# Patient Record
Sex: MALE | Race: WHITE
[De-identification: names, ages, dates, MRNs, and addresses within clinical notes are randomized per-mention and may not be internally consistent; named-entity substitution may affect disease eponyms.]

---
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
## 2019-06-15 ENCOUNTER — HOSPITAL ENCOUNTER (EMERGENCY)
Dept: HOSPITAL 41 - JD.ED | Age: 19
Discharge: HOME | End: 2019-06-15
Payer: COMMERCIAL

## 2019-06-15 DIAGNOSIS — A08.4: Primary | ICD-10-CM

## 2019-06-15 PROCEDURE — 85025 COMPLETE CBC W/AUTO DIFF WBC: CPT

## 2019-06-15 PROCEDURE — 99284 EMERGENCY DEPT VISIT MOD MDM: CPT

## 2019-06-15 PROCEDURE — 86140 C-REACTIVE PROTEIN: CPT

## 2019-06-15 PROCEDURE — 80053 COMPREHEN METABOLIC PANEL: CPT

## 2019-06-15 PROCEDURE — 36415 COLL VENOUS BLD VENIPUNCTURE: CPT

## 2019-06-15 PROCEDURE — 96374 THER/PROPH/DIAG INJ IV PUSH: CPT

## 2019-06-15 NOTE — EDM.PDOC
ED HPI GENERAL MEDICAL PROBLEM





- General


Chief Complaint: Gastrointestinal Problem


Stated Complaint: VOMITING AND LIGHTHEADED


Time Seen by Provider: 06/15/19 10:40


Source of Information: Reports: Patient


History Limitations: Reports: No Limitations





- History of Present Illness


INITIAL COMMENTS - FREE TEXT/NARRATIVE: 





Patient states that he awoke from sleep around 0430 hrs. with acute onset of 

nausea and vomiting. He indicates the first 3 emesis contained food eaten for 

supper last night which was fish and chips from [employer]. After this 

emesis is been bilious but nothing will stay down. Associated feeling of fever 

with chills. Some diffuse epigastric abdominal discomfort not radiating through 

to his back. No recent heavy alcohol use. No previous abdominal surgery. No 

diarrhea. Nurses have gone ahead and started him on IV normal saline at open 

and have given him Zofran 4 mg IV for nausea and he is starting to feel 

improved at the time of my exam.


Onset: Today


Onset Date: 06/15/19


Onset Time: 04:30


Duration: Hour(s):


Location: Reports: Abdomen


Quality: Reports: Ache (Epigastric pain with recurrent nausea and vomiting 

since 4:30 this morning)


Severity: Moderate


Improves with: Reports: None


Worsens with: Reports: Other


Context: Reports: Other.  Denies: Activity (Trying to drink.), Exercise, Lifting

, Sick Contact, Trauma


Associated Symptoms: Reports: Loss of Appetite, Malaise, Nausea/Vomiting.  

Denies: Confusion, Chest Pain, Cough, cough w sputum, Diaphoresis, Fever/Chills

, Headaches, Rash, Seizure (Intractable nausea and vomiting since 0430 hrs. 

this morning), Shortness of Breath, Syncope


Treatments PTA: Reports: Other (see below) (None.)





- Related Data


 Allergies











Allergy/AdvReac Type Severity Reaction Status Date / Time


 


No Known Allergies Allergy   Verified 06/15/19 10:12











Home Meds: 


 Home Meds





Dicyclomine [Bentyl] 20 mg PO Q6H PRN #5 tablet 06/15/19 [Rx]


Ondansetron [Zofran] 4 mg BUCCAL Q6H PRN #5 tab 06/15/19 [Rx]











Past Medical History





- Past Health History


Medical/Surgical History: Denies Medical/Surgical History





Social & Family History





- Tobacco Use


Smoking Status *Q: Never Smoker





- Caffeine Use


Caffeine Use: Reports: None





- Recreational Drug Use


Recreational Drug Use: No





- Living Situation & Occupation


Living situation: Reports: Single, with Family


Occupation: Student





ED ROS GENERAL





- Review of Systems


Review Of Systems: See Below


Constitutional: Reports: No Symptoms


HEENT: Reports: No Symptoms


Respiratory: Reports: No Symptoms


Cardiovascular: Reports: No Symptoms


Endocrine: Reports: No Symptoms


GI/Abdominal: Reports: No Symptoms


: Reports: No Symptoms


Musculoskeletal: Reports: No Symptoms


Skin: Reports: No Symptoms


Neurological: Reports: No Symptoms


Psychiatric: Reports: No Symptoms


Hematologic/Lymphatic: Reports: No Symptoms


Immunologic: Reports: No Symptoms





ED EXAM, GI/ABD





- Physical Exam


Exam: See Below


Exam Limited By: No Limitations


General Appearance: Alert, WD/WN, No Apparent Distress, Other (Does not feel 

warm to palpation. Nurses report temperature 37.2. Pulse is 83 and sinus 

respiratory of 18. BP is slightly elevated 141/86. Sats are 100% on room air.)


Eyes: Bilateral: Normal Appearance (No scleral icterus.)


Throat/Mouth: Other


Head: Atraumatic, Normocephalic (Mouth is mildly dry and tongue is coated.)


Neck: Normal Inspection, Supple, Non-Tender, Full Range of Motion.  No: 

Lymphadenopathy (L), Lymphadenopathy (R)


Respiratory/Chest: No Respiratory Distress, Lungs Clear, Normal Breath Sounds, 

No Accessory Muscle Use


Cardiovascular: Normal Peripheral Pulses, Regular Rate, Rhythm, No Edema, No 

Gallop, No Murmur, No Rub


GI/Abdominal Exam: Tender (Bowel sounds are fairly quiet sent in all 4 

quadrants.), Abnormal Bowel Sounds, Other.  No: Guarding ( Mild tenderness in 

the epigastrium.), Rigid, Rebound


 (Male) Exam: No Hernia (No peritoneal signs.)


Back Exam: Normal Inspection, Full Range of Motion.  No: CVA Tenderness (L)


Extremities: Normal Inspection, Normal Range of Motion, Non-Tender, Normal 

Capillary Refill


Neurological: Alert, Oriented, CN II-XII Intact, Normal Cognition


Psychiatric: Normal Affect, Normal Mood


Skin Exam: Warm, Dry, Intact, Normal Color, No Rash





Course





- Vital Signs


Last Recorded V/S: 


 Last Vital Signs











Temp  37.2 C   06/15/19 10:08


 


Pulse  83   06/15/19 10:08


 


Resp  18   06/15/19 10:08


 


BP  141/86 H  06/15/19 10:08


 


Pulse Ox  100   06/15/19 10:08








 





Orthostatic Blood Pressure [     129/83


Standing]                        


Orthostatic Blood Pressure [     138/83


Supine]                          











- Orders/Labs/Meds


Labs: 


 Laboratory Tests











  06/15/19 06/15/19 Range/Units





  10:15 10:15 


 


WBC  12.36 H   (4.23-9.07)  K/mm3


 


RBC  5.71   (4.63-6.08)  M/mm3


 


Hgb  17.2  D   (13.7-17.5)  gm/L


 


Hct  48.8   (40.1-51.0)  %


 


MCV  85.5   (79.0-92.2)  fl


 


MCH  30.1   (25.7-32.2)  pg


 


MCHC  35.2   (32.2-35.5)  g/dl


 


RDW Std Deviation  39.4   (35.1-43.9)  fL


 


Plt Count  175   (163-337)  K/mm3


 


MPV  10.6   (9.4-12.3)  fl


 


Neut % (Auto)  89.4 H   (34.0-67.9)  %


 


Lymph % (Auto)  3.4 L   (21.8-53.1)  %


 


Mono % (Auto)  6.5   (5.3-12.2)  %


 


Eos % (Auto)  0.3 L   (0.8-7.0)  


 


Baso % (Auto)  0.1   (0.1-1.2)  %


 


Neut # (Auto)  11.05 H   (1.78-5.38)  K/mm3


 


Lymph # (Auto)  0.42 L   (1.32-3.57)  K/mm3


 


Mono # (Auto)  0.80   (0.30-0.82)  K/mm3


 


Eos # (Auto)  0.04   (0.04-0.54)  K/mm3


 


Baso # (Auto)  0.01   (0.01-0.08)  K/mm3


 


Manual Slide Review  Abnormal smear   


 


Sodium   141  (136-145)  mEq/L


 


Potassium   3.9  (3.5-5.1)  mEq/L


 


Chloride   104  ()  mEq/L


 


Carbon Dioxide   27  (21-32)  mEq/L


 


Anion Gap   13.9  (5-15)  


 


BUN   21 H  (7-18)  mg/dL


 


Creatinine   1.1  (0.7-1.3)  mg/dL


 


Est Cr Clr Drug Dosing   135.00  mL/min


 


Estimated GFR (MDRD)   > 60  (>60)  mL/min


 


BUN/Creatinine Ratio   19.1 H  (14-18)  


 


Glucose   118 H  ()  mg/dL


 


Calcium   9.6  (8.5-10.1)  mg/dL


 


Total Bilirubin   0.7  (0.2-1.0)  mg/dL


 


AST   23  (15-37)  U/L


 


ALT   28  (16-63)  U/L


 


Alkaline Phosphatase   85  ()  U/L


 


C-Reactive Protein   < 0.2  (<1.0)  mg/dL


 


Total Protein   7.6  (6.4-8.2)  g/dl


 


Albumin   4.5  (3.4-5.0)  g/dl


 


Globulin   3.1  gm/dL


 


Albumin/Globulin Ratio   1.5  (1-2)  











Meds: 


Medications














Discontinued Medications














Generic Name Dose Route Start Last Admin





  Trade Name Freq  PRN Reason Stop Dose Admin


 


Dicyclomine HCl  10 mg  06/15/19 11:29  06/15/19 11:33





  Bentyl  PO  06/15/19 11:30  10 mg





  ONETIME ONE   Administration





     





     





     





     


 


Sodium Chloride  1,000 mls @ 999 mls/hr  06/15/19 10:14  06/15/19 10:18





  Normal Saline  IV  06/15/19 11:14  999 mls/hr





  ONETIME ONE   Administration





     





     





     





     


 


Ondansetron HCl  4 mg  06/15/19 10:14  06/15/19 10:18





  Zofran  IVPUSH  06/15/19 10:15  4 mg





  ONETIME ONE   Administration





     





     





     





     














- Radiology Interpretation


Free Text/Narrative:: 


19-year-old male presents to the ED with acute onset of nausea vomiting 

initially of fluid content that ED for supper times the first 3 emeses and then 

bilious emesis since without any hematemesis. No associated development of 

diarrhea. He reports feeling chilled and feverish. Temperature is reportedly 

37.2 he does not feel that warm to palpation. Benign abdominal exam. Suspect 

viral gastritis. IV normal saline at open. Zofran 4 mg IV.








- Re-Assessments/Exams


Free Text/Narrative Re-Assessment/Exam: 





06/15/19 10:59 Labs reveal a white count of 12.36 with 89.4% neutrophils on the 

auto differential. Hemoglobin is 17.2 with hematocrit of 48.8 indicating some 

degree of hemoconcentration. Platelet count is normal at 175,000. His lymph 

count is 3.4% which is very low. Sodium 141 with potassium of 3.9. Chloride 104 

the bicarbonate 27. And a gap is 13.9. BUN is 21 with a creatinine of 1.1. GFR 

remains greater than 60. BUN/creatinine ratio is 19.1 mildly elevated. Calcium 

is 9.6. Liver function normal. C-reactive protein less than 0.2. Total protein 

7.6 with an albumin fraction of 4.5





06/15/19 11:34  he has tolerated oral fluids i.e. Gatorade and some crackers 

without issue. Will be discharged home to continue clear fluids and advance 

diet as tolerated. Zofran 4 mg under the tongue every 4-6 hours needed for 

relief of nausea and vomiting. Recommended that he take a tablet at 1600 hrs. 

today and then will adopt a wait and see approach. Bentyl 20 mg every 6 hours 

needed for relief of abdominal pain. Follow-up as necessary.














Departure





- Departure


Time of Disposition: 11:56


Disposition: Home, Self-Care 01


Condition: Fair


Clinical Impression: 


 Viral gastritis, Nausea and vomiting in adult patient








- Discharge Information


*PRESCRIPTION DRUG MONITORING PROGRAM REVIEWED*: Not Applicable


*COPY OF PRESCRIPTION DRUG MONITORING REPORT IN PATIENT CADE: Not Applicable


Prescriptions: 


Dicyclomine [Bentyl] 20 mg PO Q6H PRN #5 tablet


 PRN Reason: Abdominal cramps/diarrhea


Ondansetron [Zofran] 4 mg BUCCAL Q6H PRN #5 tab


 PRN Reason: nausea or vomiting


Instructions:  Viral Gastroenteritis, Adult, Easy-to-Read, Nausea and Vomiting, 

Adult


Referrals: 


PCP,Not In Area [Primary Care Provider] - 


Forms:  ED Department Discharge


Additional Instructions: 


Evaluation the emergency room today in regards to development of acute onset of 

nausea and vomiting at 0430 hrs. this morning. Appears to be little chance for 

food poisoning. Appears that this is most likely viral in origin. Lab work did 

not show anything significant. You're treated with a liter of IV fluids to 

improve your hydration and mild dehydration. Zofran 4 mg IV stopped vomiting. 

He met at home is clear fluids such as Gatorade/Powerade 5-6 ounces sipped per 

hour for the next 16 hours. When hungry try soda crackers. If they are 

tolerated you may use GM on toast or white bread. Jell-O is okay at any time. 

For supper you may want to try soup such as turkey rice/chicken noodle. Suggest 

taking a Zofran tablet under the tongue at 4:00 this afternoon to prevent 

further nausea or vomiting. May be used every 4 hours as needed if you feel 

nauseated or develop vomiting. Avoid all dairy products and no apple or grape 

juice until tomorrow when you know for sure you're not going to develop any 

diarrhea. You are given Bentyl 20 mg by mouth before leaving the ED and this 

may be repeated every 6 hours with the next dose potentially due at 5:30 this 

afternoon if needed for abdominal pain.